# Patient Record
Sex: FEMALE | Race: WHITE | NOT HISPANIC OR LATINO | Employment: FULL TIME | URBAN - METROPOLITAN AREA
[De-identification: names, ages, dates, MRNs, and addresses within clinical notes are randomized per-mention and may not be internally consistent; named-entity substitution may affect disease eponyms.]

---

## 2019-10-24 ENCOUNTER — GYNECOLOGY VISIT (OUTPATIENT)
Dept: OBGYN | Facility: MEDICAL CENTER | Age: 34
End: 2019-10-24
Payer: COMMERCIAL

## 2019-10-24 VITALS — DIASTOLIC BLOOD PRESSURE: 74 MMHG | BODY MASS INDEX: 25.16 KG/M2 | WEIGHT: 142 LBS | SYSTOLIC BLOOD PRESSURE: 120 MMHG

## 2019-10-24 DIAGNOSIS — Z12.4 CERVICAL CANCER SCREENING: ICD-10-CM

## 2019-10-24 DIAGNOSIS — Z01.419 ENCOUNTER FOR WELL WOMAN EXAM WITH ROUTINE GYNECOLOGICAL EXAM: ICD-10-CM

## 2019-10-24 PROCEDURE — 99385 PREV VISIT NEW AGE 18-39: CPT | Performed by: OBSTETRICS & GYNECOLOGY

## 2019-10-24 NOTE — PROGRESS NOTES
Well Woman Exam    CC: well woman exam        HPI: Chelle Abbasi is a 34 y.o.  female who presents for her Annual Gynecologic Exam  Currently using BTL for contraception, sexually active w/ 1 male partner.    Reports postpartum anxiety/depression that she has not really been evaluated for treated for and would like to discuss.  Reports cries a lot, feels anxious/unhappy a lot.  Started a few weeks after last delivery (20 mos ago) and still there.  Feels like she had trouble after 1st baby too.   Hx of depression in the family.    No hx of diagnosis anxiety/depression; sometimes has panic attacks, denies AVH   Fleeting thoughts of hurting self but not that she reports she would ever act on.  No hx of suicide attempt in the past.  No plan to hurt herself.  No current thoughts of suicide.      Health Maintenance:  Pap: reports normal 1-2yrs ago    ROS:  Gen: denies fevers, general concerns  CV/resp: reports no concerns  Breasts: no masses/changes  GI: denies abd pain, GI concerns  : denies irregular vaginal bleeding, discharge, pain, denies urinary complaints  Psych: see above    OB History    Para Term  AB Living   2 2 2     2   SAB TAB Ectopic Molar Multiple Live Births             2      # Outcome Date GA Lbr Juanpablo/2nd Weight Sex Delivery Anes PTL Lv   2 Term 17 39w0d    CS-LTranv   GEOFFREY   1 Term 11/25/15 40w0d    CS-LTranv   GEOFFREY         GYN Hx:   Monthly menses  Denies hx of STIs  Denies hx of abnl paps     Past Medical History:   Diagnosis Date   • Cold     16   • Head ache        Past Surgical History:   Procedure Laterality Date   • CYST EXCISION Right 2016    Procedure: CYST EXCISION SEBACEOUS BUTTOCK;  Surgeon: Julia Penaloza M.D.;  Location: SURGERY [facility];  Service:    • PRIMARY C SECTION  2015    Procedure: PRIMARY C SECTION;  Surgeon: Richard Juarez M.D.;  Location: LABOR AND DELIVERY;  Service:    • OTHER  2007    wisdom teeth        Medications:   Current Outpatient Medications Ordered in Epic   Medication Sig Dispense Refill   • aspirin effervescent (ROXANNE-SELTZER) 325 MG Effer Tab Take 1 Tab by mouth 2 times a day as needed.       No current Epic-ordered facility-administered medications on file.        Allergies: Patient has no known allergies.    Social History     Tobacco Use   • Smoking status: Never Smoker   • Smokeless tobacco: Never Used   Substance Use Topics   • Alcohol use: No     Alcohol/week: 0.0 oz   • Drug use: No   denies hx of EtOH/drug use or abuse      Family History   Problem Relation Age of Onset   • Cancer Mother         uterine cancer?   • Diabetes Neg Hx    • Heart Disease Neg Hx    reports mom had hysterectomy with possible precancer?    Physical Exam   /74 (BP Location: Left arm, Patient Position: Sitting)   Wt 64.4 kg (142 lb)   LMP 10/04/2019   Breastfeeding? No   BMI 25.16 kg/m²   gen: AAO, NAD, affect appropriate  Neck: non-tender, no masses, no thyromegaly/nodules appreciated  CV: RRR; no LE edema  resp: ctab  Breast: symmetric, no skin changes, no masses, nontender, no nipple discharge, no lymphadenopathy  abd: soft, NT, ND, no masses, no organomegaly, no hernias  : NEFG, normal urethral meatus, normal anus/perineum, normal vagina and cervix.  Uterus small, anteverted, no adnexal masses/tenderness  Skin: warm/dry, no lesions    Breast and pelvic exam chaperoned by MA (AB).  Assessment:   34 y.o.  here for well woman exam  1. Encounter for well woman exam with routine gynecological exam     2. Cervical cancer screening     3. Postpartum depression  REFERRAL TO GYNECOLOGY    TSH       Plan:   Pap:not due, reviewed pap guidelines; record release for last pap today.    PP depression: Denies suicidal ideation currently.  Occasional fleeting thoughts of doing something harmful but not that she reports she feels like she would ever act on them.  Discussed crisis hotline/suicide prevention/ED/911  if with thoughts of self harm.  F/u asap with Mark, urgent referral sent today.  Offered and excepting of antidepressant medication, prescription sent for Zoloft 50 mg daily to start now.  Discussed it may take several weeks 1 month approximately to see positive effect from the medication.  Discussed may also need increased dosing, and/or alternate medication if this 1 is not working well enough.      Follow up in 1 year for WWE or PRN  Follow-up ASAP with Mark.  Patient instructed if she has not heard by the end of next week about scheduling with Mark to let us know.      Flaquita Mosqueda MD  [facility], Women's Health

## 2020-02-12 ENCOUNTER — GYNECOLOGY VISIT (OUTPATIENT)
Dept: OBGYN | Facility: CLINIC | Age: 35
End: 2020-02-12
Payer: COMMERCIAL

## 2020-02-12 VITALS — DIASTOLIC BLOOD PRESSURE: 82 MMHG | BODY MASS INDEX: 26.22 KG/M2 | WEIGHT: 148 LBS | SYSTOLIC BLOOD PRESSURE: 138 MMHG

## 2020-02-12 DIAGNOSIS — F41.9 ANXIETY: ICD-10-CM

## 2020-02-12 DIAGNOSIS — F43.22 ADJUSTMENT DISORDER WITH ANXIOUS MOOD: ICD-10-CM

## 2020-02-12 PROCEDURE — 99215 OFFICE O/P EST HI 40 MIN: CPT | Performed by: NURSE PRACTITIONER

## 2020-02-12 RX ORDER — HYDROXYZINE HYDROCHLORIDE 25 MG/1
25 TABLET, FILM COATED ORAL
Qty: 60 TAB | Refills: 0 | Status: SHIPPED | OUTPATIENT
Start: 2020-02-12

## 2020-02-12 NOTE — PATIENT INSTRUCTIONS
Your care was provided today by: ESTRELLA Montgomery    Thank You for the opportunity to serve you.    You may receive a brief survey in the mail or via email shortly regarding your visit today. Please take a few moments to complete the survey and return it. We are working to serve our patient population better, improve customer service and our patients overall experience and your input can help us to accomplish this. We thank you for your help and for the opportunity to serve you today and in the future.     Labs and Diagnostic Testing   Please note that if we have ordered labs or diagnostic testing, those results may be released to you on Sodbustert prior to my review. While we do our best to review your results as soon as possible, there are times when you may see your results prior to provider review. Of course, if you ever have any questions or concerns about your results, please contact our clinic.     Special Instructions:  Always call 9-1-1 immediately if you develop a life threatening emergency.  You may also contact the National Suicide Prevention Hotline: 8-987-267-NRUD    If you or a friend are looking for resources regarding postpartum depression or anxiety, you may call [employer] at 1-368.465.1441, #1 en Romina, #2 for English. You may also text 636-745-6065. You may also visit their web site: http://www.postpartum.net/    Unless told otherwise please take all medications as directed and complete prescription therapies. If you ever have concerns or questions, please contact our office.     Watch for the following signs that require additional evaluation: progressive lethargy or unresponsiveness, localized pain (chest, abdomen), shortness of breath, painful breathing, progressive vomiting with weakness, bloody stools, or new rash.

## 2020-02-13 NOTE — ASSESSMENT & PLAN NOTE
2020    Pregnancy/Birth History: Patient has 2 children ages 4 and almost 2 years old.  Patient reports that she had fairly easy pregnancies although her first birth was certainly a little bit traumatic.  Her baby's heart rate was declining and she ended up having an emergency  under general sedation.  Her second child was born via scheduled  which she reports was much more of a healing experience.  She admits that after her first child was born, she certainly experienced some postpartum depression although she reports that she was able to deal with it without any treatment.  She then saw Dr. Feldman for her postpartum follow-up in 2019, at that time she was initiated on Zoloft of which she reports she is been tolerating well without any adverse side effect.    History of depression/anxiety: Patient reports she is had a history of anxiety prior to postpartum depression, however she has been able to cope without any medication, no hospitalizations and never seen a therapist.    Feeding: Not currently breast-feeding, she did breast-feed her first child for 9 months and her second for 6 months.    Partner/Social Support: Patient reports she has really great support from her partner, they work at the [employer].  She does have some contention between her and her family which have different political views and sometimes that does cause her anxiety.    History of Trauma: Patient denies any history of emotional, physical or sexual trauma    Current living situation/household members: Currently living with her partner and their 2 children    Relevant family history/structure/dynamics: Her family and her tend to have different political views, they have a group text message and sometimes this becomes a source of her anxiety.    Denies history of thyroid disorder, anemia, vitamin D deficiency

## 2020-02-13 NOTE — PROGRESS NOTES
Subjective:     Chelle Abbasi is a 34 y.o. female here today for evaluation and management of the following:     Date of assessment:   PCP: Gela White M.D.  Persons in attendance: Patient  Total face-to-face time: 45 minutes    Adjustment disorder with anxious mood  2020    Pregnancy/Birth History: Patient has 2 children ages 4 and almost 2 years old.  Patient reports that she had fairly easy pregnancies although her first birth was certainly a little bit traumatic.  Her baby's heart rate was declining and she ended up having an emergency  under general sedation.  Her second child was born via scheduled  which she reports was much more of a healing experience.  She admits that after her first child was born, she certainly experienced some postpartum depression although she reports that she was able to deal with it without any treatment.  She then saw Dr. Feldman for her postpartum follow-up in 2019, at that time she was initiated on Zoloft of which she reports she is been tolerating well without any adverse side effect.    History of depression/anxiety: Patient reports she is had a history of anxiety prior to postpartum depression, however she has been able to cope without any medication, no hospitalizations and never seen a therapist.    Feeding: Not currently breast-feeding, she did breast-feed her first child for 9 months and her second for 6 months.    Partner/Social Support: Patient reports she has really great support from her partner, they work at the [employer] office.  She does have some contention between her and her family which have different political views and sometimes that does cause her anxiety.    History of Trauma: Patient denies any history of emotional, physical or sexual trauma    Current living situation/household members: Currently living with her partner and their 2 children    Relevant family history/structure/dynamics: Her family and her tend to have  different political views, they have a group text message and sometimes this becomes a source of her anxiety.    Denies history of thyroid disorder, anemia, vitamin D deficiency                       Current medicines (including changes today)  Current Outpatient Medications   Medication Sig Dispense Refill   • sertraline (ZOLOFT) 50 MG Tab Take 1.5 Tabs by mouth every day. 135 Tab 2   • hydrOXYzine HCl (ATARAX) 25 MG Tab Take 1 Tab by mouth at bedtime as needed for Anxiety. 60 Tab 0     No current facility-administered medications for this visit.        She  has a past medical history of Cold and Head ache.    She  has a past surgical history that includes primary c section (11/25/2015); other (2007); cyst excision (Right, 5/13/2016); and tubal coagulation laparoscopic bilateral.     Social History     Socioeconomic History   • Marital status:      Spouse name: Not on file   • Number of children: Not on file   • Years of education: Not on file   • Highest education level: Not on file   Occupational History   • Not on file   Social Needs   • Financial resource strain: Not on file   • Food insecurity     Worry: Not on file     Inability: Not on file   • Transportation needs     Medical: Not on file     Non-medical: Not on file   Tobacco Use   • Smoking status: Never Smoker   • Smokeless tobacco: Never Used   Substance and Sexual Activity   • Alcohol use: No     Alcohol/week: 0.0 oz   • Drug use: No   • Sexual activity: Yes     Partners: Male     Birth control/protection: Female Sterilization, Surgical     Comment: , Work as    Lifestyle   • Physical activity     Days per week: Not on file     Minutes per session: Not on file   • Stress: Not on file   Relationships   • Social connections     Talks on phone: Not on file     Gets together: Not on file     Attends [employer]ism service: Not on file     Active member of club or organization: Not on file     Attends meetings of clubs or organizations: Not  on file     Relationship status: Not on file   • Intimate partner violence     Fear of current or ex partner: Not on file     Emotionally abused: Not on file     Physically abused: Not on file     Forced sexual activity: Not on file   Other Topics Concern   • Not on file   Social History Narrative   • Not on file           Family History   Problem Relation Age of Onset   • Cancer Mother         uterine cancer?   • Cancer Sister    • Diabetes Neg Hx    • Heart Disease Neg Hx        Family History of Psych related illness/treatment: denies      PSYCHIATRIC TREATMENT HISTORY:  History of outpatient psych treatment: denies prior treatment other than initiation of Zoloft at her postpartum follow-up.                History of psych hospitalizations: denies      History of prior treatment/psychotherpay/medication management: Currently taking Zoloft 50 mg daily.    DEVELOPMENTAL HISTORY:  Patient was born in [place], raised by both her parents in [facility].  She does report growing up was good, she is the third of 4 siblings.  Her family is very conservative and this is sometimes been difficult for her as she identifies as more liberal.       EDUCATIONAL:  Highest grade level completed: Patient completed high school, also a 4-year degree at [employer] for interior design.  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    SUBSTANCE USE/ADDICTION HISTORY:  Is there a family history of substance use/addiction? No    Does patient acknowledge use of/dependence on substances? Denies     Any other addictive behavior reported (gambling, shopping, sex)? No       Amphetamine: Denies   Cannabis: Denies   Cocaine: Denies   Ecstasy: Denies   Hallucinogens: Denies   Inhalants: Denies   Opiates: Denies   Sedatives: Denies   Other: Denies     ABUSE/NEGLECT:  Does patient report feeling “unsafe” in his/her home, or afraid of anyone? No     History of physical, sexual, or emotional abuse? No     Is there evidence of neglect by self? No      Is there evidence of neglect of children/baby? No     Does the patient report any history of CPS/APS/police involvement related to suspected abuse/neglect or domestic violence? No      LEGAL HISTORY:  Has the patient ever been involved with juvenile, adult, or family legal systems? No   Does patient report ever committing a crime? No   Does patient report every being arrested? No   Does patient report ever being a victim of a crime? No   Does patient report involvement in any current legal issues? No        SAFETY ASSESSMENT - SELF:  Does patient acknowledge current or past symptoms of dangerousness to self?  Denies SI at present, denies any previous suicidal ideation or suicide attempts.   History of suicide by family member: Denies  History of suicide by friend/significant other: Denies      ROS  Mood: Describes current mood as okay   Anxiety: Patient admits that she still having some anxiety, reports last week she did have what appeared to be a panic attack.  She reports that she tends to be more irritable the week prior to her menstrual period.  Her period is regular, monthly, and reports that she is been feeling much better with Zoloft 50 mg but 1 week prior to her menstrual cycle she does find herself more irritable sad and cranky.    Sleep: Patient reports she sometimes does have trouble sleeping when she is worried about minimal arguments with her family.   Psychomotor/Energy:  Denies Psychomotor retardation or Chronic impulsivity  Eating/Appetite:  denies increased appetite, decreased appetite. Reports she has a good relationship with food.   Cognitive:  Denies distractibility, difficulty maintaining focus.   Psychosis:  Denies hallucinations, delusions, paranoia. Denies intrusive thoughts. Denies SI/HI. Denies thought of hurting her baby.   Social: Reports that she does have a good relationship with her family although it can be strained at times based on their political values.  Reports good  relationship with peers. Reports good relationship with partner. Denies avoiding social interactions or feeling socially withdrawn.    No fever, no chest pain, no palpitations, no shortness of breath, no abdominal pain    All other systems reviewed and are negative.        Objective:     /82   Wt 67.1 kg (148 lb)  Body mass index is 26.22 kg/m².    Physical Exam:   Constitutional: Alert, no distress, good eye contact   Respiratory: Unlabored respiratory effort, speaking in full sentences.   Skin: Warm, dry, good turgor, no rashes in visible areas.  Psych: Alert and oriented x3, appropriate affect and mood.    Participation: Active verbal participation and attentive to visit    Grooming:  clean and causal    Mood:  Patient describes their mood as okay    Affect: Appears congruent with mood, patient does not appear anxious or tearful   Cognition: Cognitive function appears intact with no perceived deficits of short   term or long term memory.    Thought process: Wnl   Thought content: Wnl   Speech: Rate within normal limits and Volume within normal limits   Perception: Within normal limits   Insight:  Within normal limits   Judgment: Within normal limits   Family/couple interaction observations: None observed      Assessment and Plan:   The following treatment plan was discussed    [place]  Depression Scale  Score 2020: 15      1. Adjustment disorder with anxious mood  Lengthy discussion with patient in regards to possible diagnoses, cannot rule out bipolar disorder?  MDD?  Panic?  Anxiety?  That being said, patient appears to be tolerating Zoloft 50 mg although she is having some concern with mood liability based on her cycle.  Perhaps PMDD?  I have discussed with patient treatment options for all of the above, at this time she appears to be tolerating Zoloft well and we have discussed increasing to 75 mg, particularly the 1 week when she is feeling more mood liability.  Discussed with patient  possible side effects.  Also discussed the risks and benefits.  Also discussed therapeutic dosing of Zoloft, she may consider increasing to 75 mg daily all of the time.  I have also discussed the importance of sleep, patient will initiate 25 mg of hydroxyzine as needed for sleep.  Discussed possible side effects.  She is also open to therapy.  As such I have placed a referral.  I did discuss with patient my upcoming departure from [facility].  I provided her with information where she may follow-up with therapy, she of course may follow-up with Dr. Feldman as well.  Provided patient with information on local and online resources.  Discussed signs or symptoms to seek more emergent care.  She verbalized understanding.  - sertraline (ZOLOFT) 50 MG Tab; Take 1.5 Tabs by mouth every day.  Dispense: 135 Tab; Refill: 2  - hydrOXYzine HCl (ATARAX) 25 MG Tab; Take 1 Tab by mouth at bedtime as needed for Anxiety.  Dispense: 60 Tab; Refill: 0  - REFERRAL TO BEHAVIORAL HEALTH    2. Anxiety  - REFERRAL TO BEHAVIORAL HEALTH      Current Suicide/Homicide Risk: Low   Safety Plan completed/reviewed, information provided for appropriate contacts     Discussed with patient s/s to seek emergent care or to report to ER.     Reviewed indication, dosage, usage and potential adverse effects of prescribed medications. Patient appears to understand, verbalizes understanding and is willing to try medications as prescribed.      Reviewed risks and benefits of treatment plan. Patient verbally agrees to plan of care.    Total 45 minutes face-to-face time spent with patient, with greater than 50% of the total time discussing patient's issues and symptoms as listed above in assessment and plan, as well as managing coordination of care for future evaluation and treatment.       Followup: Return in about 3 months (around 5/12/2020).    SARAH Caceres.     PLEASE NOTE: This dictation was created using voice recognition software. I have  made every reasonable attempt to correct obvious errors, but I expect that there may be errors of grammar and possibly content that I did not discover prior finalizing this note.

## 2020-12-28 ENCOUNTER — TELEPHONE (OUTPATIENT)
Dept: OBGYN | Facility: CLINIC | Age: 35
End: 2020-12-28

## 2020-12-28 DIAGNOSIS — F43.22 ADJUSTMENT DISORDER WITH ANXIOUS MOOD: ICD-10-CM

## 2020-12-28 NOTE — TELEPHONE ENCOUNTER
Pt was notified that Rx was sent to pharmacy and notified pt she would need to keep her upcoming appt for further refills.  Pt understood and will come in as scheduled.

## 2020-12-28 NOTE — TELEPHONE ENCOUNTER
Pt called asking if we can refill her ZOLOF. She has an appt scheduled 1/26/20 but has ran out and has been out for a week now.

## 2021-01-26 ENCOUNTER — GYNECOLOGY VISIT (OUTPATIENT)
Dept: OBGYN | Facility: CLINIC | Age: 36
End: 2021-01-26
Payer: COMMERCIAL

## 2021-01-26 ENCOUNTER — HOSPITAL ENCOUNTER (OUTPATIENT)
Facility: MEDICAL CENTER | Age: 36
End: 2021-01-26
Attending: OBSTETRICS & GYNECOLOGY
Payer: COMMERCIAL

## 2021-01-26 VITALS — WEIGHT: 150 LBS | DIASTOLIC BLOOD PRESSURE: 77 MMHG | BODY MASS INDEX: 26.58 KG/M2 | SYSTOLIC BLOOD PRESSURE: 133 MMHG

## 2021-01-26 DIAGNOSIS — F43.22 ADJUSTMENT DISORDER WITH ANXIOUS MOOD: ICD-10-CM

## 2021-01-26 DIAGNOSIS — Z12.4 CERVICAL CANCER SCREENING: ICD-10-CM

## 2021-01-26 PROCEDURE — 88175 CYTOPATH C/V AUTO FLUID REDO: CPT

## 2021-01-26 PROCEDURE — 96372 THER/PROPH/DIAG INJ SC/IM: CPT | Performed by: OBSTETRICS & GYNECOLOGY

## 2021-01-26 PROCEDURE — 99213 OFFICE O/P EST LOW 20 MIN: CPT | Mod: 25 | Performed by: OBSTETRICS & GYNECOLOGY

## 2021-01-26 PROCEDURE — 87624 HPV HI-RISK TYP POOLED RSLT: CPT

## 2021-01-26 RX ORDER — MEDROXYPROGESTERONE ACETATE 150 MG/ML
150 INJECTION, SUSPENSION INTRAMUSCULAR ONCE
Status: COMPLETED | OUTPATIENT
Start: 2021-01-26 | End: 2021-01-26

## 2021-01-26 RX ADMIN — MEDROXYPROGESTERONE ACETATE 150 MG: 150 INJECTION, SUSPENSION INTRAMUSCULAR at 16:30

## 2021-01-26 NOTE — NON-PROVIDER
Pt here to discuss BC to help with PMDD Symptoms   Pt states also needs refill on sertraline ( Zoloft )  Good # [phone]   Pharmacy verified.    Depo Provera Administered Today  NDC : 7101-1041-44  LOT : [id number]  EXP : 06/22  R Dorsogluteal   Next round will be : April 13th -27th 2021

## 2021-01-27 LAB
CYTOLOGY REG CYTOL: NORMAL
HPV HR 12 DNA CVX QL NAA+PROBE: NEGATIVE
HPV16 DNA SPEC QL NAA+PROBE: NEGATIVE
HPV18 DNA SPEC QL NAA+PROBE: NEGATIVE
SPECIMEN SOURCE: NORMAL

## 2021-01-28 NOTE — PROGRESS NOTES
Chief complaint: Medication refill      History of present illness: 35 y.o. presents to office for discussion of PMDD symptoms.  Patient reports that before cycle she has significant mood changes.  She was started on Zoloft 75 mg which have helped her symptoms but she would like to be started on contraception to see if it have any further beneficial effects.  No other complaints at this time    She does report the most of her symptoms occur when she is having her cycle and then she is interested in ways of minimizing the cycles she has per year.      Review of systems:  Pertinent positives documented in HPI and a comprehensive review of system is negative    All PMH, PSH, allergies, social history and FH reviewed and updated today:  Past Medical History:   Diagnosis Date   • Cold     5/5/16   • Head ache        Past Surgical History:   Procedure Laterality Date   • CYST EXCISION Right 5/13/2016    Procedure: CYST EXCISION SEBACEOUS BUTTOCK;  Surgeon: Julia Penaloza M.D.;  Location: SURGERY [facility];  Service:    • PRIMARY C SECTION  11/25/2015    Procedure: PRIMARY C SECTION;  Surgeon: Richard Juarez M.D.;  Location: LABOR AND DELIVERY;  Service:    • OTHER  2007    wisdom teeth   • TUBAL COAGULATION LAPAROSCOPIC BILATERAL         Allergies: No Known Allergies    Social History     Socioeconomic History   • Marital status:      Spouse name: Not on file   • Number of children: Not on file   • Years of education: Not on file   • Highest education level: Not on file   Occupational History   • Not on file   Social Needs   • Financial resource strain: Not on file   • Food insecurity     Worry: Not on file     Inability: Not on file   • Transportation needs     Medical: Not on file     Non-medical: Not on file   Tobacco Use   • Smoking status: Never Smoker   • Smokeless tobacco: Never Used   Substance and Sexual Activity   • Alcohol use: No     Alcohol/week: 0.0 oz   • Drug use: No   • Sexual activity:  Yes     Partners: Male     Birth control/protection: Female Sterilization, Surgical     Comment: , Work as    Lifestyle   • Physical activity     Days per week: Not on file     Minutes per session: Not on file   • Stress: Not on file   Relationships   • Social connections     Talks on phone: Not on file     Gets together: Not on file     Attends Sabianism service: Not on file     Active member of club or organization: Not on file     Attends meetings of clubs or organizations: Not on file     Relationship status: Not on file   • Intimate partner violence     Fear of current or ex partner: Not on file     Emotionally abused: Not on file     Physically abused: Not on file     Forced sexual activity: Not on file   Other Topics Concern   • Not on file   Social History Narrative   • Not on file       Family History   Problem Relation Age of Onset   • Cancer Mother         uterine cancer?   • Cancer Sister    • Diabetes Neg Hx    • Heart Disease Neg Hx        Physical exam:  /77   Wt 68 kg (150 lb)     GENERAL APPEARANCE: healthy, alert, no distress  NEURO Awake, alert and oriented x 3, Normal gait, no sensory deficits  SKIN No rashes, or ulcers or lesions seen  PSYCHIATRIC: Patient shows appropriate affect, is alert and oriented x3, intact judgment and insight.      Assessment:  1. Adjustment disorder with anxious mood  sertraline (ZOLOFT) 50 MG Tab   2. Cervical cancer screening  THINPREP PAP WITH HPV       Plan:    Zoloft refill sent to the patient's pharmacy.    Discussed with patient options for attempting a menorrhea.  Mainly continues parenteral pills, versus Mirena IUD versus Depo-Provera.  Pros and cons of all methods were discussed with the patient.    Patient is opted to undergo treatment with Depo-Provera at this time.  Side effects discussed with patient.  First injection given today.    Follow-up in 3 months prior to her next shot     Questions answered    Spent  15 minutes in  face-to-face patient contact in which greater than 50% of that visit was spent in counseling/coordination of care including medical and surgical options of care.

## 2022-01-20 ENCOUNTER — GYNECOLOGY VISIT (OUTPATIENT)
Dept: OBGYN | Facility: CLINIC | Age: 37
End: 2022-01-20
Payer: COMMERCIAL

## 2022-01-20 VITALS — BODY MASS INDEX: 26.58 KG/M2 | SYSTOLIC BLOOD PRESSURE: 118 MMHG | WEIGHT: 150 LBS | DIASTOLIC BLOOD PRESSURE: 71 MMHG

## 2022-01-20 DIAGNOSIS — F43.22 ADJUSTMENT DISORDER WITH ANXIOUS MOOD: ICD-10-CM

## 2022-01-20 PROCEDURE — 99213 OFFICE O/P EST LOW 20 MIN: CPT | Performed by: OBSTETRICS & GYNECOLOGY

## 2022-01-20 NOTE — NON-PROVIDER
Pt here for refill of Zoloft and ref for counselor   Pt states no concerns   Good # [id number]  Pharmacy verified

## 2022-01-20 NOTE — PROGRESS NOTES
GYN Visit    Cc: f/u medications    HPI: 36 y.o.  here for f/u of mood symptoms.  On zoloft 75mg daily and previously seen by Mark Slade prior to her leaving [facility] and hasn't seen  since.    Doing well overall today and denies acute mood concerns, does endorse that she needs a refill on her zoloft.  Would like to establish with .    Today also reports that the week before her menstrual cycle she notices a very clear worsening of her mood symptoms.  Reports Mark thought she might have PMDD and wondering if she can increase her dose during that time of her cycle.    S/p tubal for contraception    ROS:  Gen: denies fevers, general concerns  Abd: denies abdominal pain  Gu: denies vaginal bleeding, discharge, pain    Past Medical History:   Diagnosis Date   • Cold     16   • Head ache        /71   Wt 68 kg (150 lb)   BMI 26.58 kg/m²   Gen; AAO, NAD      A/P: 36 y.o.  with hx of depression  - refill of zoloft sent as requested.  Discussed she can increase zoloft during luteal phase although not specifically evidence based.  Could also increase overall dosing although pt reports good mood control the rest of the time, prefers to increase just during that portion of the cycle.  Will increase to 100mg daily during luteal phase.   - referral placed to  to establish for long term care.      F/U: prn/annually    Flaquita Mosqueda MD  [facility], Women's Health

## 2022-07-15 ENCOUNTER — TELEPHONE (OUTPATIENT)
Dept: SCHEDULING | Facility: IMAGING CENTER | Age: 37
End: 2022-07-15

## 2022-07-22 ENCOUNTER — OFFICE VISIT (OUTPATIENT)
Dept: MEDICAL GROUP | Facility: LAB | Age: 37
End: 2022-07-22
Payer: COMMERCIAL

## 2022-07-22 VITALS
WEIGHT: 150 LBS | BODY MASS INDEX: 26.58 KG/M2 | HEART RATE: 60 BPM | HEIGHT: 63 IN | OXYGEN SATURATION: 98 % | SYSTOLIC BLOOD PRESSURE: 100 MMHG | RESPIRATION RATE: 12 BRPM | TEMPERATURE: 97.5 F | DIASTOLIC BLOOD PRESSURE: 60 MMHG

## 2022-07-22 DIAGNOSIS — Z13.1 DIABETES MELLITUS SCREENING: ICD-10-CM

## 2022-07-22 DIAGNOSIS — Z76.89 ENCOUNTER TO ESTABLISH CARE: ICD-10-CM

## 2022-07-22 DIAGNOSIS — Z13.29 SCREENING FOR THYROID DISORDER: ICD-10-CM

## 2022-07-22 DIAGNOSIS — Z12.83 SKIN CANCER SCREENING: ICD-10-CM

## 2022-07-22 DIAGNOSIS — K64.9 HEMORRHOIDS, UNSPECIFIED HEMORRHOID TYPE: ICD-10-CM

## 2022-07-22 DIAGNOSIS — Z13.220 LIPID SCREENING: ICD-10-CM

## 2022-07-22 PROBLEM — F32.81 PMDD (PREMENSTRUAL DYSPHORIC DISORDER): Status: ACTIVE | Noted: 2022-07-22

## 2022-07-22 PROCEDURE — 99385 PREV VISIT NEW AGE 18-39: CPT | Performed by: PHYSICIAN ASSISTANT

## 2022-07-22 RX ORDER — HYDROCORTISONE 25 MG/G
1 CREAM TOPICAL DAILY
Qty: 30 G | Refills: 0 | Status: SHIPPED | OUTPATIENT
Start: 2022-07-22

## 2022-07-22 ASSESSMENT — PATIENT HEALTH QUESTIONNAIRE - PHQ9: CLINICAL INTERPRETATION OF PHQ2 SCORE: 0

## 2022-07-22 NOTE — PROGRESS NOTES
"Subjective:     CC:  Diagnoses of Encounter to establish care, Lipid screening, Screening for thyroid disorder, Diabetes mellitus screening, Skin cancer screening, and Hemorrhoids, unspecified hemorrhoid type were pertinent to this visit.    HISTORY OF THE PRESENT ILLNESS: Patient is a 37 y.o. female. This pleasant patient is here today to establish care and discuss several concerns. His/her prior PCP was none.      PMDD  Takes zoloft for this  appt with OBGYN next week  Considering hormone therapy    Hemorrhoids  Patient reports she is a chronic history of hemorrhoids and often gets flareups after periods.  She reports a chronic history of constipation as well.  Patient states recently that the hemorrhoids have become more prominent and more painful around her.  She also reports more blood with wiping occasionally.  Denies any blood in stool.  Denies dark, bloody, tarry stool.  Patient history recently treated hemorrhoids with sitz bath's she has not tried any over-the-counter creams or prescription medications      Health Maintenance     - Dyslipidemia (30-45): Ordered today  - Diabetes (HTN, HLD, BMI >25): Ordered today  - Depression screening (PHQ-2 and/or PHQ-9): Negative  - Dental: UTD  - Eye: UTD  Diet: \"pretty level\"  Exercise: pretty active  Substance Use:  Denies  Tobacco Use/counseling: Denies       Cancer screening  - Cervical CA (21-65): Follows OB/GYN for this  -  HX Abnormal pap/HPV: none  - Skin cancer screening: Dermatology referral ordered     Infectious disease screening/Immunizations  --Immunizations: Up-to-date    Current Outpatient Medications Ordered in Epic   Medication Sig Dispense Refill   • sertraline (ZOLOFT) 50 MG Tab Take 1.5 Tablets by mouth every day.     • hydrocortisone rectal (ANUSOL-HC) 2.5% Cream Insert 1 Application into the rectum every day. 30 g 0   • hydrOXYzine HCl (ATARAX) 25 MG Tab Take 1 Tab by mouth at bedtime as needed for Anxiety. 60 Tab 0     No current Epic-ordered " "facility-administered medications on file.       Health Maintenance: Completed    ROS:   Gen: no fevers/chills, no changes in weight  Eyes: no changes in vision  ENT: no sore throat, no hearing loss, no bloody nose  Pulm: no sob, no cough  CV: no chest pain, no palpitations  GI: no nausea/vomiting, no diarrhea  : no dysuria  MSk: no myalgias  Skin: no rash  Neuro: no headaches, no numbness/tingling  Heme/Lymph: no easy bruising      Objective:       Exam: /60   Pulse 60   Temp 36.4 °C (97.5 °F)   Resp 12   Ht 1.6 m (5' 3\")   Wt 68 kg (150 lb)   SpO2 98%  Body mass index is 26.57 kg/m².    General: Normal appearing. No distress.  HEENT: Normocephalic. Eyes conjunctiva clear lids without ptosis, pupils equal and reactive to light accommodation, ears normal shape and contour, canals are clear bilaterally, tympanic membranes are benign, nasal mucosa benign, oropharynx is without erythema, edema or exudates.   Neck: Supple without JVD or bruit. Thyroid is not enlarged.  Pulmonary: Clear to ausculation.  Normal effort. No rales, ronchi, or wheezing.  Cardiovascular: Regular rate and rhythm without murmur. Carotid and radial pulses are intact and equal bilaterally.  Abdomen: Soft, nontender, nondistended. Normal bowel sounds. Liver and spleen are not palpable  Anus:  2 nonthrombosed hemorrhoids noted at 12:00 and 6 o'clock position  Neurologic: Grossly nonfocal  Lymph: No cervical or supraclavicular lymph nodes are palpable  Skin: Warm and dry.  No obvious lesions.  Musculoskeletal: Normal gait. No extremity cyanosis, clubbing, or edema.  Psych: Normal mood and affect. Alert and oriented x3. Judgment and insight is normal.      Assessment & Plan:   37 y.o. female with the following -    1. Encounter to establish care  Labs per orders  Vaccinations per orders  Screenings per orders      2. Lipid screening  - CBC WITH DIFFERENTIAL; Future  - Comp Metabolic Panel; Future  - Lipid Profile; Future    3. Screening " for thyroid disorder  - TSH WITH REFLEX TO FT4; Future    4. Diabetes mellitus screening  - HEMOGLOBIN A1C; Future    5. Skin cancer screening  - Referral to Dermatology    6. Hemorrhoids, unspecified hemorrhoid type  Chronic condition, new to me  Discussed use of psyllium fiber supplement or stool softeners to prevent constipation/straining.  Patient can also continue sitz bath's, squatty potty, bidet use to help with symptoms.  We will also try a topical hydrocortisone cream  - hydrocortisone rectal (ANUSOL-HC) 2.5% Cream; Insert 1 Application into the rectum every day.  Dispense: 30 g; Refill: 0      I spent a total of 20 minutes with record review, exam, communication with the patient, communication with other providers, and documentation of this encounter.    Return in about 4 weeks (around 8/19/2022).    Please note that this dictation was created using voice recognition software. I have made every reasonable attempt to correct obvious errors, but I expect that there are errors of grammar and possibly content that I did not discover before finalizing the note.

## 2022-07-28 ENCOUNTER — HOSPITAL ENCOUNTER (OUTPATIENT)
Dept: LAB | Facility: MEDICAL CENTER | Age: 37
End: 2022-07-28
Attending: PHYSICIAN ASSISTANT
Payer: COMMERCIAL

## 2022-07-28 DIAGNOSIS — Z13.29 SCREENING FOR THYROID DISORDER: ICD-10-CM

## 2022-07-28 DIAGNOSIS — Z13.1 DIABETES MELLITUS SCREENING: ICD-10-CM

## 2022-07-28 DIAGNOSIS — Z13.220 LIPID SCREENING: ICD-10-CM

## 2022-07-28 LAB
ALBUMIN SERPL BCP-MCNC: 4.3 G/DL (ref 3.2–4.9)
ALBUMIN/GLOB SERPL: 1.9 G/DL
ALP SERPL-CCNC: 52 U/L (ref 30–99)
ALT SERPL-CCNC: 8 U/L (ref 2–50)
ANION GAP SERPL CALC-SCNC: 8 MMOL/L (ref 7–16)
AST SERPL-CCNC: 21 U/L (ref 12–45)
BASOPHILS # BLD AUTO: 0.9 % (ref 0–1.8)
BASOPHILS # BLD: 0.04 K/UL (ref 0–0.12)
BILIRUB SERPL-MCNC: 0.3 MG/DL (ref 0.1–1.5)
BUN SERPL-MCNC: 10 MG/DL (ref 8–22)
CALCIUM SERPL-MCNC: 9.2 MG/DL (ref 8.5–10.5)
CHLORIDE SERPL-SCNC: 106 MMOL/L (ref 96–112)
CHOLEST SERPL-MCNC: 168 MG/DL (ref 100–199)
CO2 SERPL-SCNC: 24 MMOL/L (ref 20–33)
CREAT SERPL-MCNC: 0.82 MG/DL (ref 0.5–1.4)
EOSINOPHIL # BLD AUTO: 0.22 K/UL (ref 0–0.51)
EOSINOPHIL NFR BLD: 4.9 % (ref 0–6.9)
ERYTHROCYTE [DISTWIDTH] IN BLOOD BY AUTOMATED COUNT: 45.6 FL (ref 35.9–50)
EST. AVERAGE GLUCOSE BLD GHB EST-MCNC: 100 MG/DL
FASTING STATUS PATIENT QL REPORTED: NORMAL
GFR SERPLBLD CREATININE-BSD FMLA CKD-EPI: 94 ML/MIN/1.73 M 2
GLOBULIN SER CALC-MCNC: 2.3 G/DL (ref 1.9–3.5)
GLUCOSE SERPL-MCNC: 82 MG/DL (ref 65–99)
HBA1C MFR BLD: 5.1 % (ref 4–5.6)
HCT VFR BLD AUTO: 37.5 % (ref 37–47)
HDLC SERPL-MCNC: 55 MG/DL
HGB BLD-MCNC: 12.5 G/DL (ref 12–16)
IMM GRANULOCYTES # BLD AUTO: 0.01 K/UL (ref 0–0.11)
IMM GRANULOCYTES NFR BLD AUTO: 0.2 % (ref 0–0.9)
LDLC SERPL CALC-MCNC: 104 MG/DL
LYMPHOCYTES # BLD AUTO: 1.58 K/UL (ref 1–4.8)
LYMPHOCYTES NFR BLD: 35.2 % (ref 22–41)
MCH RBC QN AUTO: 30.3 PG (ref 27–33)
MCHC RBC AUTO-ENTMCNC: 33.3 G/DL (ref 33.6–35)
MCV RBC AUTO: 90.8 FL (ref 81.4–97.8)
MONOCYTES # BLD AUTO: 0.35 K/UL (ref 0–0.85)
MONOCYTES NFR BLD AUTO: 7.8 % (ref 0–13.4)
NEUTROPHILS # BLD AUTO: 2.29 K/UL (ref 2–7.15)
NEUTROPHILS NFR BLD: 51 % (ref 44–72)
NRBC # BLD AUTO: 0 K/UL
NRBC BLD-RTO: 0 /100 WBC
PLATELET # BLD AUTO: 241 K/UL (ref 164–446)
PMV BLD AUTO: 10.2 FL (ref 9–12.9)
POTASSIUM SERPL-SCNC: 4.4 MMOL/L (ref 3.6–5.5)
PROT SERPL-MCNC: 6.6 G/DL (ref 6–8.2)
RBC # BLD AUTO: 4.13 M/UL (ref 4.2–5.4)
SODIUM SERPL-SCNC: 138 MMOL/L (ref 135–145)
TRIGL SERPL-MCNC: 47 MG/DL (ref 0–149)
TSH SERPL DL<=0.005 MIU/L-ACNC: 1.58 UIU/ML (ref 0.38–5.33)
WBC # BLD AUTO: 4.5 K/UL (ref 4.8–10.8)

## 2022-07-28 PROCEDURE — 80061 LIPID PANEL: CPT

## 2022-07-28 PROCEDURE — 85025 COMPLETE CBC W/AUTO DIFF WBC: CPT

## 2022-07-28 PROCEDURE — 83036 HEMOGLOBIN GLYCOSYLATED A1C: CPT

## 2022-07-28 PROCEDURE — 80053 COMPREHEN METABOLIC PANEL: CPT

## 2022-07-28 PROCEDURE — 36415 COLL VENOUS BLD VENIPUNCTURE: CPT

## 2022-07-28 PROCEDURE — 84443 ASSAY THYROID STIM HORMONE: CPT

## 2022-08-26 ENCOUNTER — OFFICE VISIT (OUTPATIENT)
Dept: MEDICAL GROUP | Facility: LAB | Age: 37
End: 2022-08-26
Payer: COMMERCIAL

## 2022-08-26 VITALS
OXYGEN SATURATION: 99 % | HEART RATE: 60 BPM | WEIGHT: 151 LBS | BODY MASS INDEX: 26.75 KG/M2 | DIASTOLIC BLOOD PRESSURE: 64 MMHG | HEIGHT: 63 IN | SYSTOLIC BLOOD PRESSURE: 98 MMHG | TEMPERATURE: 98 F | RESPIRATION RATE: 16 BRPM

## 2022-08-26 DIAGNOSIS — F32.81 PMDD (PREMENSTRUAL DYSPHORIC DISORDER): ICD-10-CM

## 2022-08-26 DIAGNOSIS — K64.9 HEMORRHOIDS, UNSPECIFIED HEMORRHOID TYPE: ICD-10-CM

## 2022-08-26 PROCEDURE — 99213 OFFICE O/P EST LOW 20 MIN: CPT | Performed by: PHYSICIAN ASSISTANT

## 2022-08-26 ASSESSMENT — FIBROSIS 4 INDEX: FIB4 SCORE: 1.17

## 2022-08-26 NOTE — PROGRESS NOTES
"Subjective:     CC: 1 mo f/u    HPI:   Chelle here today with     PMDD  -OBGYN decided to increase zoloft, which has been working well for her    Derm visit once yearly for screening    Labs largely within normal limits    Hermorrhoids improved with creams and conservative measures.         ROS:  Gen: no fevers/chills, no changes in weight  Eyes: no changes in vision  ENT: no sore throat, no hearing loss, no bloody nose  Pulm: no sob, no cough  CV: no chest pain, no palpitations  GI: no nausea/vomiting, no diarrhea  : no dysuria  MSk: no myalgias  Skin: no rash  Neuro: no headaches, no numbness/tingling  Heme/Lymph: no easy bruising    Current Outpatient Medications Ordered in Epic   Medication Sig Dispense Refill    sertraline (ZOLOFT) 50 MG Tab Take 100 mg by mouth every day.      hydrocortisone rectal (ANUSOL-HC) 2.5% Cream Insert 1 Application into the rectum every day. 30 g 0    hydrOXYzine HCl (ATARAX) 25 MG Tab Take 1 Tab by mouth at bedtime as needed for Anxiety. 60 Tab 0     No current Epic-ordered facility-administered medications on file.       Health Maintenance: Completed    Objective:     Exam:  BP (!) 98/64   Pulse 60   Temp 36.7 °C (98 °F)   Resp 16   Ht 1.6 m (5' 3\")   Wt 68.5 kg (151 lb)   SpO2 99%   BMI 26.75 kg/m²  Body mass index is 26.75 kg/m².    Gen: Alert and oriented, No apparent distress.  Neck: Neck is supple without lymphadenopathy.  Lungs: Normal effort, CTA bilaterally, no wheezes, rhonchi, or rales  CV: Regular rate and rhythm. No murmurs, rubs, or gallops.  Ext: No clubbing, cyanosis, edema.        Assessment & Plan:     37 y.o. female with the following -     1. Hemorrhoids, unspecified hemorrhoid type  Chronic condition, improving  Continue conservative measures    2. PMDD (premenstrual dysphoric disorder)  Chronic condition  Continue f/u with OBGYN for management  Continue zoloft 100mg QD      I spent a total of 15 minutes with record review, exam, communication with the " patient, communication with other providers, and documentation of this encounter.      Return in about 1 year (around 8/26/2023) for Annual Wellness.    Please note that this dictation was created using voice recognition software. I have made every reasonable attempt to correct obvious errors, but there may be errors of grammar and possibly content that I did not discover before finalizing the note.

## 2023-01-09 ENCOUNTER — OFFICE VISIT (OUTPATIENT)
Dept: MEDICAL GROUP | Facility: LAB | Age: 38
End: 2023-01-09
Payer: COMMERCIAL

## 2023-01-09 VITALS
DIASTOLIC BLOOD PRESSURE: 58 MMHG | TEMPERATURE: 97.7 F | HEIGHT: 63 IN | HEART RATE: 60 BPM | SYSTOLIC BLOOD PRESSURE: 102 MMHG | RESPIRATION RATE: 16 BRPM | OXYGEN SATURATION: 99 % | WEIGHT: 153 LBS | BODY MASS INDEX: 27.11 KG/M2

## 2023-01-09 DIAGNOSIS — K64.9 HEMORRHOIDS, UNSPECIFIED HEMORRHOID TYPE: ICD-10-CM

## 2023-01-09 PROCEDURE — 99213 OFFICE O/P EST LOW 20 MIN: CPT | Performed by: PHYSICIAN ASSISTANT

## 2023-01-09 ASSESSMENT — FIBROSIS 4 INDEX: FIB4 SCORE: 1.17

## 2023-01-09 NOTE — PROGRESS NOTES
"Subjective:     CC: hemorrhoids    HPI:   Chelle here today with     Pt reports hx of recurrent hemorrhoids. Mostly during her periods. She has been using anusol cream with some improvement in symptoms. She has been trying to increase dietary fiber and fluid intake. She notes at least one external hemorrhoid and suspects internal hemorrhoids as well. She is requesting referral to specialist for further evaluation    Denies pelvic pain, abdominal pain, N/V/D. Denies changes in appetite.       ROS:  ROS neg except as indicated above    Current Outpatient Medications Ordered in Epic   Medication Sig Dispense Refill    sertraline (ZOLOFT) 50 MG Tab Take 100 mg by mouth every day.      hydrocortisone rectal (ANUSOL-HC) 2.5% Cream Insert 1 Application into the rectum every day. 30 g 0    hydrOXYzine HCl (ATARAX) 25 MG Tab Take 1 Tab by mouth at bedtime as needed for Anxiety. 60 Tab 0     No current Epic-ordered facility-administered medications on file.         Objective:     Exam:  /58   Pulse 60   Temp 36.5 °C (97.7 °F)   Resp 16   Ht 1.6 m (5' 3\")   Wt 69.4 kg (153 lb)   SpO2 99%   BMI 27.10 kg/m²  Body mass index is 27.1 kg/m².    Constitutional: Alert, no distress, well-groomed.  Skin: Warm, dry, good turgor, no rashes in visible areas.  Eye: Equal, round and reactive, conjunctiva clear, lids normal.  ENMT: Lips without lesions, good dentition, moist mucous membranes.  Neck: Trachea midline, no masses, no thyromegaly.  Respiratory: Unlabored respiratory effort, no cough.  MSK: Normal gait, moves all extremities.  Neuro: Grossly non-focal.   Psych: Alert and oriented x3, normal affect and mood.      Assessment & Plan:     37 y.o. female with the following -     1. Hemorrhoids, unspecified hemorrhoid type  Continue Anusol cream, continue fiber supplementation, Sitz baths, stool softeners  - Referral to Gastroenterology      I spent a total of 15 minutes with record review, exam, communication with the " patient, communication with other providers, and documentation of this encounter.      Return if symptoms worsen or fail to improve.    Please note that this dictation was created using voice recognition software. I have made every reasonable attempt to correct obvious errors, but there may be errors of grammar and possibly content that I did not discover before finalizing the note.

## 2024-10-21 ENCOUNTER — OFFICE VISIT (OUTPATIENT)
Dept: MEDICAL GROUP | Facility: LAB | Age: 39
End: 2024-10-21
Payer: COMMERCIAL

## 2024-10-21 VITALS
WEIGHT: 155 LBS | OXYGEN SATURATION: 97 % | BODY MASS INDEX: 27.46 KG/M2 | TEMPERATURE: 97 F | RESPIRATION RATE: 16 BRPM | SYSTOLIC BLOOD PRESSURE: 114 MMHG | HEART RATE: 70 BPM | DIASTOLIC BLOOD PRESSURE: 60 MMHG | HEIGHT: 63 IN

## 2024-10-21 DIAGNOSIS — Z13.29 SCREENING FOR ENDOCRINE DISORDER: ICD-10-CM

## 2024-10-21 DIAGNOSIS — Z13.220 LIPID SCREENING: ICD-10-CM

## 2024-10-21 DIAGNOSIS — Z13.29 SCREENING FOR THYROID DISORDER: ICD-10-CM

## 2024-10-21 DIAGNOSIS — Z13.21 ENCOUNTER FOR VITAMIN DEFICIENCY SCREENING: ICD-10-CM

## 2024-10-21 DIAGNOSIS — G43.009 MIGRAINE WITHOUT AURA AND WITHOUT STATUS MIGRAINOSUS, NOT INTRACTABLE: ICD-10-CM

## 2024-10-21 DIAGNOSIS — R53.83 OTHER FATIGUE: ICD-10-CM

## 2024-10-21 DIAGNOSIS — Z13.1 DIABETES MELLITUS SCREENING: ICD-10-CM

## 2024-10-21 PROCEDURE — 3074F SYST BP LT 130 MM HG: CPT | Performed by: PHYSICIAN ASSISTANT

## 2024-10-21 PROCEDURE — 99214 OFFICE O/P EST MOD 30 MIN: CPT | Performed by: PHYSICIAN ASSISTANT

## 2024-10-21 PROCEDURE — 3078F DIAST BP <80 MM HG: CPT | Performed by: PHYSICIAN ASSISTANT

## 2024-10-21 RX ORDER — SUMATRIPTAN 50 MG/1
50 TABLET, FILM COATED ORAL
Qty: 10 TABLET | Refills: 3 | Status: SHIPPED | OUTPATIENT
Start: 2024-10-21

## 2024-10-21 RX ORDER — ETONOGESTREL AND ETHINYL ESTRADIOL .12; .015 MG/D; MG/D
1 RING VAGINAL
COMMUNITY

## 2024-10-21 ASSESSMENT — PATIENT HEALTH QUESTIONNAIRE - PHQ9: CLINICAL INTERPRETATION OF PHQ2 SCORE: 0

## 2024-11-11 ENCOUNTER — APPOINTMENT (OUTPATIENT)
Dept: LAB | Facility: MEDICAL CENTER | Age: 39
End: 2024-11-11

## 2024-11-12 ENCOUNTER — APPOINTMENT (OUTPATIENT)
Dept: MEDICAL GROUP | Facility: LAB | Age: 39
End: 2024-11-12
Payer: COMMERCIAL

## 2025-01-13 ENCOUNTER — HOSPITAL ENCOUNTER (OUTPATIENT)
Dept: LAB | Facility: MEDICAL CENTER | Age: 40
End: 2025-01-13
Attending: PHYSICIAN ASSISTANT
Payer: COMMERCIAL

## 2025-01-13 DIAGNOSIS — Z13.29 SCREENING FOR THYROID DISORDER: ICD-10-CM

## 2025-01-13 DIAGNOSIS — Z13.21 ENCOUNTER FOR VITAMIN DEFICIENCY SCREENING: ICD-10-CM

## 2025-01-13 DIAGNOSIS — R53.83 OTHER FATIGUE: ICD-10-CM

## 2025-01-13 DIAGNOSIS — Z13.220 LIPID SCREENING: ICD-10-CM

## 2025-01-13 DIAGNOSIS — Z13.1 DIABETES MELLITUS SCREENING: ICD-10-CM

## 2025-01-13 DIAGNOSIS — Z13.29 SCREENING FOR ENDOCRINE DISORDER: ICD-10-CM

## 2025-01-13 LAB
25(OH)D3 SERPL-MCNC: 46 NG/ML (ref 30–100)
ALBUMIN SERPL BCP-MCNC: 4.4 G/DL (ref 3.2–4.9)
ALBUMIN/GLOB SERPL: 1.6 G/DL
ALP SERPL-CCNC: 61 U/L (ref 30–99)
ALT SERPL-CCNC: 15 U/L (ref 2–50)
ANION GAP SERPL CALC-SCNC: 12 MMOL/L (ref 7–16)
AST SERPL-CCNC: 22 U/L (ref 12–45)
BASOPHILS # BLD AUTO: 0.3 % (ref 0–1.8)
BASOPHILS # BLD: 0.03 K/UL (ref 0–0.12)
BILIRUB SERPL-MCNC: 0.2 MG/DL (ref 0.1–1.5)
BUN SERPL-MCNC: 10 MG/DL (ref 8–22)
CALCIUM ALBUM COR SERPL-MCNC: 8.8 MG/DL (ref 8.5–10.5)
CALCIUM SERPL-MCNC: 9.1 MG/DL (ref 8.5–10.5)
CHLORIDE SERPL-SCNC: 101 MMOL/L (ref 96–112)
CHOLEST SERPL-MCNC: 190 MG/DL (ref 100–199)
CO2 SERPL-SCNC: 22 MMOL/L (ref 20–33)
CREAT SERPL-MCNC: 0.99 MG/DL (ref 0.5–1.4)
CRP SERPL HS-MCNC: 7.18 MG/DL (ref 0–0.75)
EOSINOPHIL # BLD AUTO: 0.11 K/UL (ref 0–0.51)
EOSINOPHIL NFR BLD: 1.3 % (ref 0–6.9)
ERYTHROCYTE [DISTWIDTH] IN BLOOD BY AUTOMATED COUNT: 41.1 FL (ref 35.9–50)
ERYTHROCYTE [SEDIMENTATION RATE] IN BLOOD BY WESTERGREN METHOD: 23 MM/HOUR (ref 0–25)
EST. AVERAGE GLUCOSE BLD GHB EST-MCNC: 100 MG/DL
FOLATE SERPL-MCNC: 14.6 NG/ML
FSH SERPL-ACNC: 0.5 MIU/ML
GFR SERPLBLD CREATININE-BSD FMLA CKD-EPI: 74 ML/MIN/1.73 M 2
GLOBULIN SER CALC-MCNC: 2.7 G/DL (ref 1.9–3.5)
GLUCOSE SERPL-MCNC: 74 MG/DL (ref 65–99)
HBA1C MFR BLD: 5.1 % (ref 4–5.6)
HCT VFR BLD AUTO: 39.2 % (ref 37–47)
HDLC SERPL-MCNC: 58 MG/DL
HGB BLD-MCNC: 13.2 G/DL (ref 12–16)
IMM GRANULOCYTES # BLD AUTO: 0.03 K/UL (ref 0–0.11)
IMM GRANULOCYTES NFR BLD AUTO: 0.3 % (ref 0–0.9)
IRON SATN MFR SERPL: 7 % (ref 15–55)
IRON SERPL-MCNC: 22 UG/DL (ref 40–170)
LDLC SERPL CALC-MCNC: 116 MG/DL
LH SERPL-ACNC: 0.3 IU/L
LYMPHOCYTES # BLD AUTO: 1.5 K/UL (ref 1–4.8)
LYMPHOCYTES NFR BLD: 17.1 % (ref 22–41)
MCH RBC QN AUTO: 29.7 PG (ref 27–33)
MCHC RBC AUTO-ENTMCNC: 33.7 G/DL (ref 32.2–35.5)
MCV RBC AUTO: 88.1 FL (ref 81.4–97.8)
MONOCYTES # BLD AUTO: 0.43 K/UL (ref 0–0.85)
MONOCYTES NFR BLD AUTO: 4.9 % (ref 0–13.4)
NEUTROPHILS # BLD AUTO: 6.67 K/UL (ref 1.82–7.42)
NEUTROPHILS NFR BLD: 76.1 % (ref 44–72)
NRBC # BLD AUTO: 0 K/UL
NRBC BLD-RTO: 0 /100 WBC (ref 0–0.2)
PLATELET # BLD AUTO: 230 K/UL (ref 164–446)
PMV BLD AUTO: 10.8 FL (ref 9–12.9)
POTASSIUM SERPL-SCNC: 4.3 MMOL/L (ref 3.6–5.5)
PROT SERPL-MCNC: 7.1 G/DL (ref 6–8.2)
RBC # BLD AUTO: 4.45 M/UL (ref 4.2–5.4)
SODIUM SERPL-SCNC: 135 MMOL/L (ref 135–145)
TIBC SERPL-MCNC: 300 UG/DL (ref 250–450)
TRIGL SERPL-MCNC: 82 MG/DL (ref 0–149)
TSH SERPL DL<=0.005 MIU/L-ACNC: 1.6 UIU/ML (ref 0.38–5.33)
UIBC SERPL-MCNC: 278 UG/DL (ref 110–370)
VIT B12 SERPL-MCNC: 298 PG/ML (ref 211–911)
WBC # BLD AUTO: 8.8 K/UL (ref 4.8–10.8)

## 2025-01-13 PROCEDURE — 80061 LIPID PANEL: CPT

## 2025-01-13 PROCEDURE — 86038 ANTINUCLEAR ANTIBODIES: CPT

## 2025-01-13 PROCEDURE — 83002 ASSAY OF GONADOTROPIN (LH): CPT

## 2025-01-13 PROCEDURE — 83550 IRON BINDING TEST: CPT

## 2025-01-13 PROCEDURE — 84443 ASSAY THYROID STIM HORMONE: CPT

## 2025-01-13 PROCEDURE — 85652 RBC SED RATE AUTOMATED: CPT

## 2025-01-13 PROCEDURE — 85025 COMPLETE CBC W/AUTO DIFF WBC: CPT

## 2025-01-13 PROCEDURE — 84402 ASSAY OF FREE TESTOSTERONE: CPT

## 2025-01-13 PROCEDURE — 82746 ASSAY OF FOLIC ACID SERUM: CPT

## 2025-01-13 PROCEDURE — 86140 C-REACTIVE PROTEIN: CPT

## 2025-01-13 PROCEDURE — 82671 ASSAY OF ESTROGENS: CPT

## 2025-01-13 PROCEDURE — 83001 ASSAY OF GONADOTROPIN (FSH): CPT

## 2025-01-13 PROCEDURE — 83036 HEMOGLOBIN GLYCOSYLATED A1C: CPT

## 2025-01-13 PROCEDURE — 83540 ASSAY OF IRON: CPT

## 2025-01-13 PROCEDURE — 84270 ASSAY OF SEX HORMONE GLOBUL: CPT

## 2025-01-13 PROCEDURE — 82306 VITAMIN D 25 HYDROXY: CPT

## 2025-01-13 PROCEDURE — 36415 COLL VENOUS BLD VENIPUNCTURE: CPT

## 2025-01-13 PROCEDURE — 84403 ASSAY OF TOTAL TESTOSTERONE: CPT

## 2025-01-13 PROCEDURE — 82607 VITAMIN B-12: CPT

## 2025-01-13 PROCEDURE — 80053 COMPREHEN METABOLIC PANEL: CPT

## 2025-01-15 LAB
ESTRADIOL SERPL HS-MCNC: 3.3 PG/ML
ESTROGEN SERPL CALC-MCNC: 27.5 PG/ML
ESTRONE SERPL-MCNC: 24.2 PG/ML
NUCLEAR IGG SER QL IA: NORMAL

## 2025-01-16 LAB
SHBG SERPL-SCNC: 437 NMOL/L (ref 25–122)
TESTOST FREE SERPL-MCNC: 0.6 PG/ML (ref 1.3–9.2)
TESTOST SERPL-MCNC: 29 NG/DL (ref 9–55)

## 2025-01-17 ENCOUNTER — APPOINTMENT (OUTPATIENT)
Dept: MEDICAL GROUP | Facility: LAB | Age: 40
End: 2025-01-17
Payer: COMMERCIAL

## 2025-01-17 VITALS
RESPIRATION RATE: 14 BRPM | TEMPERATURE: 97.6 F | DIASTOLIC BLOOD PRESSURE: 60 MMHG | BODY MASS INDEX: 23.39 KG/M2 | OXYGEN SATURATION: 98 % | HEIGHT: 63 IN | HEART RATE: 68 BPM | WEIGHT: 132 LBS | SYSTOLIC BLOOD PRESSURE: 118 MMHG

## 2025-01-17 DIAGNOSIS — G43.009 MIGRAINE WITHOUT AURA AND WITHOUT STATUS MIGRAINOSUS, NOT INTRACTABLE: ICD-10-CM

## 2025-01-17 DIAGNOSIS — Z00.00 WELLNESS EXAMINATION: ICD-10-CM

## 2025-01-17 DIAGNOSIS — Z23 NEED FOR VACCINATION: ICD-10-CM

## 2025-01-17 DIAGNOSIS — E61.1 IRON DEFICIENCY: ICD-10-CM

## 2025-01-17 PROCEDURE — 90471 IMMUNIZATION ADMIN: CPT

## 2025-01-17 PROCEDURE — 90656 IIV3 VACC NO PRSV 0.5 ML IM: CPT

## 2025-01-17 PROCEDURE — 3078F DIAST BP <80 MM HG: CPT | Performed by: PHYSICIAN ASSISTANT

## 2025-01-17 PROCEDURE — 99396 PREV VISIT EST AGE 40-64: CPT | Mod: 25 | Performed by: PHYSICIAN ASSISTANT

## 2025-01-17 PROCEDURE — 3074F SYST BP LT 130 MM HG: CPT | Performed by: PHYSICIAN ASSISTANT

## 2025-01-17 RX ORDER — SUMATRIPTAN 50 MG/1
50 TABLET, FILM COATED ORAL
Qty: 20 TABLET | Refills: 3 | Status: SHIPPED | OUTPATIENT
Start: 2025-01-17

## 2025-01-17 ASSESSMENT — FIBROSIS 4 INDEX: FIB4 SCORE: 0.99

## 2025-01-17 ASSESSMENT — PATIENT HEALTH QUESTIONNAIRE - PHQ9: CLINICAL INTERPRETATION OF PHQ2 SCORE: 0

## 2025-01-17 NOTE — PROGRESS NOTES
Subjective:     CC:   Chief Complaint   Patient presents with    Annual Exam       HPI:   Chelle Abbasi is a 39 y.o. female who presents for annual exam. She is feeling well and denies any complaints.    Verbal consent was acquired by the patient to use [employer] ambient listening note generation during this visit Yes     History of Present Illness  The patient presents for evaluation of fatigue, headaches, and health maintenance.    Elevated LDL Cholesterol  Her diet includes a significant amount of saturated fats, particularly from dairy products such as cheese.     Health Maintenance  She maintains an active lifestyle, engaging in running exercises 3 to 4 times per week. She reports no changes in her health history. She feels safe at home. She had a Pap smear a year ago and was informed by her gynecologist that she does not need another one yet. She has been ordered a mammogram. She reports no skin lesions of concern and is diligent about using SPF when exposed to the sun. She has initiated annual dermatology visits. She had chickenpox as a child and believes she has received all recommended vaccines. She received her most recent COVID-19 vaccine in January 2024. She has not received the influenza vaccine this season and is open to receiving it today.    She has been prescribed semaglutide 0.5 mg by an external provider, which she tolerates well. However, she reports occasional constipation, a known side effect of semaglutide.    She has been experiencing different types of headaches, occurring approximately twice a month. She has been using her migraine medication frequently but has run out of it. She was able to determine which headache the medication would work on.    FAMILY HISTORY  Her father has had high cholesterol for a while.    MEDICATIONS  Current: semaglutide, NuvaRing    IMMUNIZATIONS  She received her most recent COVID-19 vaccine in January 2024. She is due for her influenza  vaccine.          Health Maintenance  vit D: UTD  Cholesterol Screening: UTD   Diabetes Screening: UTD  Diet: regular   Exercise: 3-4x per week   Substance Abuse: denies   Safe in relationship.   Seat belts, bike helmet, gun safety discussed.  Sun protection used.  UTD on dental and eye exams    Cancer screening  Cervical Cancer Screening: due 2026   Breast Cancer Screening: ordered   Skin Cancer: uses SPF, hat, follows with derm    Infectious disease screening/Immunizations  --Immunizations: influenza vax given today     She  has a past medical history of Cold and Head ache.  She  has a past surgical history that includes primary c section (11/25/2015); other (2007); cyst excision (Right, 5/13/2016); and tubal coagulation laparoscopic bilateral.    Family History   Problem Relation Age of Onset    Cancer Mother         uterine cancer?    Cervical Cancer Sister     Diabetes Neg Hx     Heart Disease Neg Hx        Social History     Socioeconomic History    Marital status:      Spouse name: Not on file    Number of children: Not on file    Years of education: Not on file    Highest education level: Not on file   Occupational History    Not on file   Tobacco Use    Smoking status: Never    Smokeless tobacco: Never   Vaping Use    Vaping status: Never Used   Substance and Sexual Activity    Alcohol use: No     Alcohol/week: 0.0 oz    Drug use: No    Sexual activity: Yes     Partners: Male     Birth control/protection: Female Sterilization, Surgical     Comment: , Work as    Other Topics Concern    Not on file   Social History Narrative    Not on file     Social Drivers of Health     Financial Resource Strain: Not on file   Food Insecurity: Not on file   Transportation Needs: Not on file   Physical Activity: Not on file   Stress: Not on file   Social Connections: Not on file   Intimate Partner Violence: Not on file   Housing Stability: Not on file       Patient Active Problem List    Diagnosis Date  "Noted    PMDD (premenstrual dysphoric disorder) 07/22/2022    Hemorrhoids 07/22/2022    Adjustment disorder with anxious mood 02/12/2020    Anxiety 02/12/2020         Current Outpatient Medications   Medication Sig Dispense Refill    Semaglutide (WEGOVY) 0.5 MG/0.5ML Solution Auto-injector Pen-injector Inject 0.5 mg under the skin every 7 days.      ELURYNG 0.12-0.015 MG/24HR vaginal ring Insert 1 Each into the vagina.      SUMAtriptan (IMITREX) 50 MG Tab Take 1 Tablet by mouth one time as needed for Migraine for up to 1 dose. Repeat dose after 1 hour if symptoms persist. Max dose 2 tablets per 24 hours 10 Tablet 3    sertraline (ZOLOFT) 50 MG Tab Take 100 mg by mouth every day.      hydrocortisone rectal (ANUSOL-HC) 2.5% Cream Insert 1 Application into the rectum every day. 30 g 0    hydrOXYzine HCl (ATARAX) 25 MG Tab Take 1 Tab by mouth at bedtime as needed for Anxiety. 60 Tab 0     No current facility-administered medications for this visit.     No Known Allergies    Review of Systems   Constitutional: Negative for fever, chills and malaise/fatigue.   HENT: Negative for congestion.    Eyes: Negative for pain.    Respiratory: Negative for cough and shortness of breath.  Cardiovascular: Negative for leg swelling.   Gastrointestinal: Negative for nausea, vomiting, abdominal pain and diarrhea.   Genitourinary: Negative for dysuria and hematuria.   Skin: Negative for rash.   Neurological: Negative for dizziness, focal weakness and headaches.   Endo/Heme/Allergies: Does not bleed easily.   Psychiatric/Behavioral: Negative for depression.  The patient is not nervous/anxious.      Objective:     /60 (BP Location: Right arm, Patient Position: Sitting, BP Cuff Size: Adult)   Pulse 68   Temp 36.4 °C (97.6 °F) (Temporal)   Resp 14   Ht 1.6 m (5' 3\")   Wt 59.9 kg (132 lb)   SpO2 98%   BMI 23.38 kg/m²   Body mass index is 23.38 kg/m².  Wt Readings from Last 4 Encounters:   01/17/25 59.9 kg (132 lb)   10/21/24 70.3 " kg (155 lb)   01/09/23 69.4 kg (153 lb)   08/26/22 68.5 kg (151 lb)       Physical Exam:  Constitutional: Well-developed and well-nourished. Not diaphoretic. No distress.   Skin: Skin is warm and dry. No rash noted.  Head: Atraumatic without lesions.  Eyes: Conjunctivae and extraocular motions are normal. Pupils are equal, round, and reactive to light. No scleral icterus.   Ears:  External ears unremarkable. Tympanic membranes clear and intact.  Nose: Nares patent. Septum midline. Turbinates without erythema nor edema. No discharge.   Mouth/Throat: Dentition is intact. Tongue normal. Oropharynx is clear and moist. Posterior pharynx without erythema or exudates.  Neck: Supple, trachea midline. Normal range of motion. No thyromegaly present. No lymphadenopathy--cervical or supraclavicular.  Cardiovascular: Regular rate and rhythm, S1 and S2 without murmur, rubs, or gallops.  Lungs: Normal inspiratory effort, CTA bilaterally, no wheezes/rhonchi/rales  Abdomen: Soft, non tender, and without distention. Active bowel sounds in all four quadrants. No rebound, guarding, masses or HSM.  Extremities: No cyanosis, clubbing, erythema, nor edema. Distal pulses intact and symmetric.   Musculoskeletal: All major joints AROM full in all directions without pain.  Neurological: Alert and oriented x 3. DTRs 2+/3 and symmetric. No cranial nerve deficit. 5/5 myotomes. Sensation intact.   Psychiatric:  Behavior, mood, and affect are appropriate.      Assessment and Plan:     1. Wellness examination  She is advised to reduce her cheese intake and ensure a daily fiber intake of at least 30 g. She is also advised to incorporate lean red meats and dark leafy greens into her diet. She is due for an influenza vaccine, which will be administered today.    2. Iron deficiency  The patient's fatigue is likely due to low iron levels, despite a normal red blood cell count. The elevated CRP is also suspected to be related to iron deficiency. B12  levels are at the lower end of the normal range, which could contribute to fatigue. She is advised to increase her intake of B12-rich foods such as oily fish, egg yolks. Dietary modifications include reducing cheese consumption and ensuring a daily fiber intake of at least 30 g. She is also advised to incorporate lean red meats and dark leafy greens into her diet. Over-the-counter iron supplementation with ferrous sulfate 325 mg once or twice a week is recommended, with a caution about potential constipation and darker stools. A recheck of her iron levels will be scheduled in 2 to 3 months.  - IRON/TOTAL IRON BIND; Future  - FERRITIN; Future    3. Migraine without aura and without status migrainosus, not intractable  She will continue her current migraine medication regimen, with refills increased to 20 tablets instead of 10.  - SUMAtriptan (IMITREX) 50 MG Tab; Take 1 Tablet by mouth one time as needed for Migraine for up to 1 dose. Repeat dose after 1 hour if symptoms persist. Max dose 2 tablets per 24 hours  Dispense: 20 Tablet; Refill: 3    4. Need for vaccination  - INFLUENZA VACCINE TRI INJ (PF)      Follow-up: No follow-ups on file.